# Patient Record
Sex: MALE | Race: WHITE
[De-identification: names, ages, dates, MRNs, and addresses within clinical notes are randomized per-mention and may not be internally consistent; named-entity substitution may affect disease eponyms.]

---

## 2017-11-24 ENCOUNTER — HOSPITAL ENCOUNTER (OUTPATIENT)
Dept: HOSPITAL 53 - M LAB | Age: 32
End: 2017-11-24
Attending: INTERNAL MEDICINE
Payer: OTHER GOVERNMENT

## 2017-11-24 DIAGNOSIS — K74.60: ICD-10-CM

## 2017-11-24 DIAGNOSIS — K50.918: Primary | ICD-10-CM

## 2017-11-24 LAB
ALBUMIN SERPL BCG-MCNC: 3.5 GM/DL (ref 3.2–5.2)
ALBUMIN/GLOB SERPL: 0.85 {RATIO} (ref 1–1.93)
ALP SERPL-CCNC: 123 U/L (ref 45–117)
ALT SERPL W P-5'-P-CCNC: 11 U/L (ref 12–78)
ANION GAP SERPL CALC-SCNC: 6 MEQ/L (ref 8–16)
AST SERPL-CCNC: 11 U/L (ref 7–37)
BASOPHILS # BLD AUTO: 0 10^3/UL (ref 0–0.2)
BASOPHILS NFR BLD AUTO: 0.2 % (ref 0–1)
BILIRUB SERPL-MCNC: 2 MG/DL (ref 0.2–1)
BUN SERPL-MCNC: 8 MG/DL (ref 7–18)
CALCIUM SERPL-MCNC: 8.7 MG/DL (ref 8.5–10.1)
CHLORIDE SERPL-SCNC: 106 MEQ/L (ref 98–107)
CO2 SERPL-SCNC: 28 MEQ/L (ref 21–32)
CREAT SERPL-MCNC: 1.05 MG/DL (ref 0.7–1.3)
EOSINOPHIL # BLD AUTO: 0.1 10^3/UL (ref 0–0.5)
EOSINOPHIL NFR BLD AUTO: 2.6 % (ref 0–3)
ERYTHROCYTE [DISTWIDTH] IN BLOOD BY AUTOMATED COUNT: 12.4 % (ref 11.5–14.5)
GFR SERPL CREATININE-BSD FRML MDRD: > 60 ML/MIN/{1.73_M2} (ref 60–?)
GLUCOSE SERPL-MCNC: 97 MG/DL (ref 70–105)
IMM GRANULOCYTES NFR BLD: 0.5 % (ref 0–0)
LYMPHOCYTES # BLD AUTO: 1.1 10^3/UL (ref 1.5–4.5)
LYMPHOCYTES NFR BLD AUTO: 25 % (ref 24–44)
MCH RBC QN AUTO: 30.7 PG (ref 27–33)
MCHC RBC AUTO-ENTMCNC: 34 G/DL (ref 32–36.5)
MCV RBC AUTO: 90.3 FL (ref 80–96)
MONOCYTES # BLD AUTO: 0.4 10^3/UL (ref 0–0.8)
MONOCYTES NFR BLD AUTO: 9 % (ref 0–5)
NEUTROPHILS # BLD AUTO: 2.7 10^3/UL (ref 1.8–7.7)
NEUTROPHILS NFR BLD AUTO: 62.7 % (ref 36–66)
NRBC BLD AUTO-RTO: 0 % (ref 0–0)
PLATELET # BLD AUTO: 113 10^3/UL (ref 150–450)
POTASSIUM SERPL-SCNC: 4 MEQ/L (ref 3.5–5.1)
PROT SERPL-MCNC: 7.6 GM/DL (ref 6.4–8.2)
SODIUM SERPL-SCNC: 140 MEQ/L (ref 136–145)
WBC # BLD AUTO: 4.2 10^3/UL (ref 4–10)

## 2017-12-18 ENCOUNTER — HOSPITAL ENCOUNTER (EMERGENCY)
Dept: HOSPITAL 53 - M ED | Age: 32
Discharge: HOME | End: 2017-12-18
Payer: OTHER GOVERNMENT

## 2017-12-18 VITALS — WEIGHT: 165.35 LBS | BODY MASS INDEX: 21.91 KG/M2 | HEIGHT: 73 IN

## 2017-12-18 VITALS — DIASTOLIC BLOOD PRESSURE: 87 MMHG | SYSTOLIC BLOOD PRESSURE: 139 MMHG

## 2017-12-18 DIAGNOSIS — Z88.6: ICD-10-CM

## 2017-12-18 DIAGNOSIS — R11.2: Primary | ICD-10-CM

## 2017-12-18 DIAGNOSIS — R10.9: ICD-10-CM

## 2017-12-18 DIAGNOSIS — K74.60: ICD-10-CM

## 2017-12-18 DIAGNOSIS — Z79.899: ICD-10-CM

## 2017-12-18 DIAGNOSIS — I85.00: ICD-10-CM

## 2017-12-18 DIAGNOSIS — Z96.89: ICD-10-CM

## 2017-12-18 LAB
ALBUMIN SERPL BCG-MCNC: 3.3 GM/DL (ref 3.2–5.2)
ALBUMIN/GLOB SERPL: 0.6 {RATIO} (ref 1–1.93)
ALP SERPL-CCNC: 123 U/L (ref 45–117)
ALT SERPL W P-5'-P-CCNC: 16 U/L (ref 12–78)
ANION GAP SERPL CALC-SCNC: 6 MEQ/L (ref 8–16)
AST SERPL-CCNC: 35 U/L (ref 7–37)
BASOPHILS # BLD AUTO: 0 10^3/UL (ref 0–0.2)
BASOPHILS NFR BLD AUTO: 0.2 % (ref 0–1)
BILIRUB CONJ SERPL-MCNC: 0.3 MG/DL (ref 0–0.2)
BILIRUB SERPL-MCNC: 2.1 MG/DL (ref 0.2–1)
BUN SERPL-MCNC: 12 MG/DL (ref 7–18)
CALCIUM SERPL-MCNC: 8.9 MG/DL (ref 8.5–10.1)
CHLORIDE SERPL-SCNC: 98 MEQ/L (ref 98–107)
CO2 SERPL-SCNC: 28 MEQ/L (ref 21–32)
CREAT SERPL-MCNC: 1 MG/DL (ref 0.7–1.3)
EOSINOPHIL # BLD AUTO: 0.1 10^3/UL (ref 0–0.5)
EOSINOPHIL NFR BLD AUTO: 1.1 % (ref 0–3)
ERYTHROCYTE [DISTWIDTH] IN BLOOD BY AUTOMATED COUNT: 11.9 % (ref 11.5–14.5)
GFR SERPL CREATININE-BSD FRML MDRD: > 60 ML/MIN/{1.73_M2} (ref 60–?)
GLUCOSE SERPL-MCNC: 85 MG/DL (ref 70–105)
IMM GRANULOCYTES NFR BLD: 0.7 % (ref 0–0)
IMMATURE PLATELET FRACTION %: 3.5 % (ref 0–10.9)
INR PPP: 1.06
LYMPHOCYTES # BLD AUTO: 0.5 10^3/UL (ref 1.5–4.5)
LYMPHOCYTES NFR BLD AUTO: 11.3 % (ref 24–44)
MCH RBC QN AUTO: 30.8 PG (ref 27–33)
MCHC RBC AUTO-ENTMCNC: 35.4 G/DL (ref 32–36.5)
MCV RBC AUTO: 87 FL (ref 80–96)
MONOCYTES # BLD AUTO: 0.5 10^3/UL (ref 0–0.8)
MONOCYTES NFR BLD AUTO: 11.3 % (ref 0–5)
NEUTROPHILS # BLD AUTO: 3.3 10^3/UL (ref 1.8–7.7)
NEUTROPHILS NFR BLD AUTO: 75.4 % (ref 36–66)
NRBC BLD AUTO-RTO: 0 % (ref 0–0)
PLATELET # BLD AUTO: 97 10^3/UL (ref 150–450)
PLATELET F: 3.4
POTASSIUM SERPL-SCNC: 4.1 MEQ/L (ref 3.5–5.1)
PROT SERPL-MCNC: 8.8 GM/DL (ref 6.4–8.2)
SODIUM SERPL-SCNC: 132 MEQ/L (ref 136–145)
WBC # BLD AUTO: 4.4 10^3/UL (ref 4–10)

## 2017-12-22 ENCOUNTER — HOSPITAL ENCOUNTER (OUTPATIENT)
Dept: HOSPITAL 53 - M RAD | Age: 32
End: 2017-12-22
Attending: INTERNAL MEDICINE
Payer: OTHER GOVERNMENT

## 2017-12-22 DIAGNOSIS — K50.918: Primary | ICD-10-CM

## 2017-12-22 DIAGNOSIS — K74.60: ICD-10-CM

## 2017-12-22 DIAGNOSIS — K80.20: ICD-10-CM

## 2017-12-22 NOTE — REP
ULTRASOUND ABDOMEN WITH DUPLEX DOPPLER EVALUATION OF PORTAL VASCULATURE:

 

Real-time sonographic evaluation of the abdomen performed.   The gallbladder is

partially contracted and contains multiple mobile gallstones.  There is no

gallbladder wall thickening or pericholecystic fluid. There is no intrahepatic or

extrahepatic biliary dilatation, common bile duct measuring 5 mm in diameter.

There is mild hepatomegaly. TIPS shunt is noted.  There is mild splenomegaly with

a length of 16.6 cm.  No intrinsic splenic abnormality is seen.  The visualized

pancreas is grossly unremarkable, not well seen due to overlying bowel gas. The

kidneys are normal in size and echotexture, right kidney measuring 12.0 x 6.8 x

5.6 cm and the left kidney 13.5 x 6.2 x 6.0 cm.  There is no hydronephrosis,

renal mass or nephrolithiasis. The abdominal aorta is normal in caliber with no

aneurysm, maximum AP diameter proximally 1.8 cm, mid aspect 2.0 cm and distally

1.6 cm. There is no ascites.

 

Real-time ultrasound evaluation and duplex Doppler interrogation of the portal

vasculature is performed.  The main portal vein is mildly dilated at 17 mm.  Peak

velocity is 39.7 cm/s and diminished in the intrahepatic portal vein branches.

TIPS shunt is patent.  Peak velocity in the proximal aspect of the shunt is 38.5

cm/s, mid aspect 26.1 cm/s and at the distal aspect 117.1 cm/s.  There is no

intraluminal thrombus.  The proximal velocities are lower than expected but there

is no evidence of stenosis.  There is normal direction of flow in the remaining

portal vasculature including the splenic vein, which demonstrates a peak velocity

of 29.3 cm/s.  Peak systolic velocity of the main hepatic artery was 52.6 cm/s

with a resistive index of 0.7.  The hepatic veins demonstrate triphasic

waveforms.

 

IMPRESSION:

 

Mild hepatosplenomegaly.

 

Multiple gallstones in a contracted gallbladder without biliary dilatation or

free fluid.

 

 Patent tips shunt as discussed in detail above.

 

 

Signed by

Gurvinder Davila MD 12/22/2017 03:51 P

## 2017-12-26 ENCOUNTER — HOSPITAL ENCOUNTER (EMERGENCY)
Dept: HOSPITAL 53 - M ED | Age: 32
Discharge: ANOTHER HOSPITAL | End: 2017-12-26
Payer: OTHER GOVERNMENT

## 2017-12-26 DIAGNOSIS — K75.0: Primary | ICD-10-CM

## 2017-12-26 DIAGNOSIS — Z96.89: ICD-10-CM

## 2017-12-26 DIAGNOSIS — Z79.899: ICD-10-CM

## 2017-12-26 DIAGNOSIS — D62: ICD-10-CM

## 2017-12-26 DIAGNOSIS — Z88.6: ICD-10-CM

## 2017-12-26 DIAGNOSIS — K52.9: ICD-10-CM

## 2017-12-26 DIAGNOSIS — R50.9: ICD-10-CM

## 2017-12-26 DIAGNOSIS — K80.20: ICD-10-CM

## 2017-12-26 LAB
ALBUMIN/GLOBULIN RATIO: 0.46 (ref 1–1.93)
ALBUMIN: 2.6 GM/DL (ref 3.2–5.2)
ALKALINE PHOSPHATASE: 137 U/L (ref 45–117)
ALT SERPL W P-5'-P-CCNC: 12 U/L (ref 12–78)
AMYLASE SERPL-CCNC: 41 U/L (ref 25–115)
ANION GAP: 9 MEQ/L (ref 8–16)
AST SERPL-CCNC: 21 U/L (ref 7–37)
BASO #: 0 10^3/UL (ref 0–0.2)
BASO %: 0.1 % (ref 0–1)
BILIRUB CONJ SERPL-MCNC: 1 MG/DL (ref 0–0.2)
BILIRUBIN,TOTAL: 2.7 MG/DL (ref 0.2–1)
BLOOD UREA NITROGEN: 10 MG/DL (ref 7–18)
CALCIUM LEVEL: 8.2 MG/DL (ref 8.5–10.1)
CARBON DIOXIDE LEVEL: 25 MEQ/L (ref 21–32)
CHLORIDE LEVEL: 99 MEQ/L (ref 98–107)
CREATININE FOR GFR: 0.93 MG/DL (ref 0.7–1.3)
EOS #: 0.1 10^3/UL (ref 0–0.5)
EOSINOPHIL NFR BLD AUTO: 0.7 % (ref 0–3)
GFR SERPL CREATININE-BSD FRML MDRD: > 60 ML/MIN/{1.73_M2} (ref 60–?)
GLUCOSE, FASTING: 97 MG/DL (ref 70–105)
IMMATURE GRANULOCYTE #: 0.1 10^3/UL (ref 0–0)
IMMATURE GRANULOCYTE %: 0.6 % (ref 0–0)
INR: 1.19
INR: 1.27
LACTIC ACID SEPSIS PROTOCOL: 1.2 MMOL/L (ref 0.4–2)
LYMPH #: 0.7 10^3/UL (ref 1.5–4.5)
LYMPH %: 7.9 % (ref 24–44)
MEAN CORPUSCULAR HEMOGLOBIN: 30.4 PG (ref 27–33)
MEAN CORPUSCULAR HGB CONC: 34.6 G/DL (ref 32–36.5)
MEAN CORPUSCULAR VOLUME: 87.9 FL (ref 80–96)
MONO #: 0.8 10^3/UL (ref 0–0.8)
MONO %: 9.4 % (ref 0–5)
NEUTROPHILS #: 6.6 10^3/UL (ref 1.8–7.7)
NEUTROPHILS %: 81.3 % (ref 36–66)
NRBC BLD AUTO-RTO: 0 % (ref 0–0)
PLATELET COUNT, AUTOMATED: 166 10^3/UL (ref 150–450)
POTASSIUM SERUM: 3.7 MEQ/L (ref 3.5–5.1)
RED CELL DISTRIBUTION WIDTH: 11.9 % (ref 11.5–14.5)
SODIUM LEVEL: 133 MEQ/L (ref 136–145)
TOTAL PROTEIN: 8.2 GM/DL (ref 6.4–8.2)
WHITE BLOOD COUNT: 8.2 10^3/UL (ref 4–10)

## 2017-12-26 RX ADMIN — DIATRIZOATE MEGLUMINE AND DIATRIZOATE SODIUM 1 ML: 600; 100 SOLUTION ORAL; RECTAL at 13:47

## 2017-12-26 RX ADMIN — DIATRIZOATE MEGLUMINE AND DIATRIZOATE SODIUM 1 ML: 600; 100 SOLUTION ORAL; RECTAL at 14:15

## 2017-12-26 RX ADMIN — DEXTROSE MONOHYDRATE 1 MLS/HR: 50 INJECTION, SOLUTION INTRAVENOUS at 18:34

## 2017-12-26 RX ADMIN — ACETAMINOPHEN 1 MG: 325 TABLET ORAL at 17:25

## 2017-12-26 RX ADMIN — PIPERACILLIN SODIUM AND TAZOBACTAM SODIUM 1 MLS/HR: 36; 4.5 INJECTION, POWDER, FOR SOLUTION INTRAVENOUS at 17:25

## 2017-12-26 RX ADMIN — SODIUM CHLORIDE 1 MLS/HR: 9 INJECTION, SOLUTION INTRAVENOUS at 18:34

## 2017-12-26 RX ADMIN — SODIUM CHLORIDE 1 MLS/HR: 9 INJECTION, SOLUTION INTRAVENOUS at 11:00

## 2018-01-20 ENCOUNTER — HOSPITAL ENCOUNTER (EMERGENCY)
Dept: HOSPITAL 53 - M ED | Age: 33
Discharge: ANOTHER HOSPITAL | End: 2018-01-20
Payer: COMMERCIAL

## 2018-01-20 DIAGNOSIS — K74.60: ICD-10-CM

## 2018-01-20 DIAGNOSIS — K75.0: Primary | ICD-10-CM

## 2018-01-20 DIAGNOSIS — Z79.899: ICD-10-CM

## 2018-01-20 DIAGNOSIS — Z88.6: ICD-10-CM

## 2018-01-20 DIAGNOSIS — F17.200: ICD-10-CM

## 2018-01-20 DIAGNOSIS — I82.621: ICD-10-CM

## 2018-01-20 DIAGNOSIS — Z95.9: ICD-10-CM

## 2018-01-20 DIAGNOSIS — J45.909: ICD-10-CM

## 2018-01-20 LAB
ALBUMIN/GLOBULIN RATIO: 0.44 (ref 1–1.93)
ALBUMIN: 2.9 GM/DL (ref 3.2–5.2)
ALKALINE PHOSPHATASE: 154 U/L (ref 45–117)
ALT SERPL W P-5'-P-CCNC: 7 U/L (ref 12–78)
ANION GAP: 6 MEQ/L (ref 8–16)
AST SERPL-CCNC: 18 U/L (ref 7–37)
BASO #: 0 10^3/UL (ref 0–0.2)
BASO %: 0.6 % (ref 0–1)
BILIRUB CONJ SERPL-MCNC: 0.3 MG/DL (ref 0–0.2)
BILIRUBIN,TOTAL: 0.9 MG/DL (ref 0.2–1)
BLOOD UREA NITROGEN: 6 MG/DL (ref 7–18)
CALCIUM LEVEL: 8.6 MG/DL (ref 8.5–10.1)
CARBON DIOXIDE LEVEL: 28 MEQ/L (ref 21–32)
CHLORIDE LEVEL: 105 MEQ/L (ref 98–107)
CREATININE FOR GFR: 0.78 MG/DL (ref 0.7–1.3)
EOS #: 0.1 10^3/UL (ref 0–0.5)
EOSINOPHIL NFR BLD AUTO: 2.8 % (ref 0–3)
GFR SERPL CREATININE-BSD FRML MDRD: > 60 ML/MIN/{1.73_M2} (ref 60–?)
GLUCOSE, FASTING: 115 MG/DL (ref 70–105)
HEMATOCRIT: 42.7 % (ref 42–52)
HEMOGLOBIN: 13.8 G/DL (ref 14–18)
IMMATURE GRANULOCYTE #: 0 10^3/UL (ref 0–0)
IMMATURE GRANULOCYTE %: 0.9 % (ref 0–0)
INR: 1.15
LACTIC ACID SEPSIS PROTOCOL: 1.4 MMOL/L (ref 0.4–2)
LYMPH #: 1.3 10^3/UL (ref 1.5–4.5)
LYMPH %: 26.7 % (ref 24–44)
MEAN CORPUSCULAR HEMOGLOBIN: 28.9 PG (ref 27–33)
MEAN CORPUSCULAR HGB CONC: 32.3 G/DL (ref 32–36.5)
MEAN CORPUSCULAR VOLUME: 89.5 FL (ref 80–96)
MONO #: 0.5 10^3/UL (ref 0–0.8)
MONO %: 10.9 % (ref 0–5)
NEUTROPHILS #: 2.7 10^3/UL (ref 1.8–7.7)
NEUTROPHILS %: 58.1 % (ref 36–66)
NRBC BLD AUTO-RTO: 0 % (ref 0–0)
PARTIAL THROMBOPLASTIN TIME: 38.8 SECONDS (ref 26.8–37.9)
PLATELET COUNT, AUTOMATED: 202 10^3/UL (ref 150–450)
POTASSIUM SERUM: 4 MEQ/L (ref 3.5–5.1)
PROTHROMBIN TIME: 14.9 SECONDS (ref 12.4–14.5)
RED BLOOD COUNT: 4.77 10^6/UL (ref 4.3–6.1)
RED CELL DISTRIBUTION WIDTH: 13 % (ref 11.5–14.5)
SODIUM LEVEL: 139 MEQ/L (ref 136–145)
TOTAL PROTEIN: 9.5 GM/DL (ref 6.4–8.2)
WHITE BLOOD COUNT: 4.7 10^3/UL (ref 4–10)

## 2018-01-20 RX ADMIN — SODIUM CHLORIDE 1 ML: 9 INJECTION, SOLUTION INTRAMUSCULAR; INTRAVENOUS; SUBCUTANEOUS at 10:32

## 2018-01-20 RX ADMIN — ENOXAPARIN SODIUM 1 MG: 100 INJECTION SUBCUTANEOUS at 10:45

## 2018-01-20 RX ADMIN — Medication 1 UNITS: at 10:32

## 2018-03-01 ENCOUNTER — HOSPITAL ENCOUNTER (EMERGENCY)
Dept: HOSPITAL 53 - M ED | Age: 33
Discharge: HOME | End: 2018-03-01
Payer: COMMERCIAL

## 2018-03-01 DIAGNOSIS — J45.909: ICD-10-CM

## 2018-03-01 DIAGNOSIS — Z79.51: ICD-10-CM

## 2018-03-01 DIAGNOSIS — K50.90: ICD-10-CM

## 2018-03-01 DIAGNOSIS — I26.99: Primary | ICD-10-CM

## 2018-03-01 DIAGNOSIS — Z77.098: ICD-10-CM

## 2018-03-01 DIAGNOSIS — Z79.01: ICD-10-CM

## 2018-03-01 DIAGNOSIS — Z88.6: ICD-10-CM

## 2018-03-01 DIAGNOSIS — K74.60: ICD-10-CM

## 2018-03-01 DIAGNOSIS — D64.9: ICD-10-CM

## 2018-03-01 DIAGNOSIS — Z79.899: ICD-10-CM

## 2018-03-01 LAB
ALBUMIN/GLOBULIN RATIO: 0.77 (ref 1–1.93)
ALBUMIN: 3.3 GM/DL (ref 3.2–5.2)
ALKALINE PHOSPHATASE: 89 U/L (ref 45–117)
ALT SERPL W P-5'-P-CCNC: 11 U/L (ref 12–78)
ANION GAP: 5 MEQ/L (ref 8–16)
AST SERPL-CCNC: 22 U/L (ref 7–37)
BASO #: 0 10^3/UL (ref 0–0.2)
BASO %: 0.2 % (ref 0–1)
BILIRUB CONJ SERPL-MCNC: 0.2 MG/DL (ref 0–0.2)
BILIRUBIN,TOTAL: 0.9 MG/DL (ref 0.2–1)
BLOOD UREA NITROGEN: 5 MG/DL (ref 7–18)
CALCIUM LEVEL: 8.4 MG/DL (ref 8.5–10.1)
CARBON DIOXIDE LEVEL: 30 MEQ/L (ref 21–32)
CHLORIDE LEVEL: 105 MEQ/L (ref 98–107)
CK MB CFR.DF SERPL CALC: 2.17
CK SERPL-CCNC: 46 U/L (ref 39–308)
CK-MB VALUE MASS: 1 NG/ML (ref 0–3.6)
CREATININE FOR GFR: 0.8 MG/DL (ref 0.7–1.3)
EOS #: 0.1 10^3/UL (ref 0–0.5)
EOSINOPHIL NFR BLD AUTO: 1.4 % (ref 0–3)
GFR SERPL CREATININE-BSD FRML MDRD: > 60 ML/MIN/{1.73_M2} (ref 60–?)
GLUCOSE, FASTING: 75 MG/DL (ref 70–100)
HEMATOCRIT: 45 % (ref 42–52)
HEMOGLOBIN: 15.1 G/DL (ref 14–18)
IMMATURE GRANULOCYTE %: 0.2 % (ref 0–3)
INR: 1.06
LYMPH #: 1.2 10^3/UL (ref 1.5–4.5)
LYMPH %: 28 % (ref 24–44)
MEAN CORPUSCULAR HEMOGLOBIN: 30.9 PG (ref 27–33)
MEAN CORPUSCULAR HGB CONC: 33.6 G/DL (ref 32–36.5)
MEAN CORPUSCULAR VOLUME: 92 FL (ref 80–96)
MONO #: 0.3 10^3/UL (ref 0–0.8)
MONO %: 7.6 % (ref 0–5)
NEUTROPHILS #: 2.7 10^3/UL (ref 1.8–7.7)
NEUTROPHILS %: 62.6 % (ref 36–66)
NRBC BLD AUTO-RTO: 0 % (ref 0–0)
PLATELET COUNT, AUTOMATED: 123 10^3/UL (ref 150–450)
POTASSIUM SERUM: 4 MEQ/L (ref 3.5–5.1)
PROTHROMBIN TIME: 13.9 SECONDS (ref 12.4–14.5)
RED BLOOD COUNT: 4.89 10^6/UL (ref 4.3–6.1)
RED CELL DISTRIBUTION WIDTH: 14.6 % (ref 11.5–14.5)
SODIUM LEVEL: 140 MEQ/L (ref 136–145)
TOTAL PROTEIN: 7.6 GM/DL (ref 6.4–8.2)
TROPONIN I: < 0.02 NG/ML (ref ?–0.1)
WHITE BLOOD COUNT: 4.4 10^3/UL (ref 4–10)

## 2018-03-01 PROCEDURE — 93005 ELECTROCARDIOGRAM TRACING: CPT

## 2022-02-18 ENCOUNTER — HOSPITAL ENCOUNTER (OUTPATIENT)
Dept: HOSPITAL 53 - M LABSMTC | Age: 37
End: 2022-02-18
Attending: ANESTHESIOLOGY
Payer: COMMERCIAL

## 2022-02-18 DIAGNOSIS — Z01.812: Primary | ICD-10-CM

## 2022-02-18 DIAGNOSIS — Z20.822: ICD-10-CM

## 2022-02-23 ENCOUNTER — HOSPITAL ENCOUNTER (OUTPATIENT)
Dept: HOSPITAL 53 - M OPP | Age: 37
Discharge: HOME | End: 2022-02-23
Attending: INTERNAL MEDICINE
Payer: COMMERCIAL

## 2022-02-23 VITALS — DIASTOLIC BLOOD PRESSURE: 66 MMHG | SYSTOLIC BLOOD PRESSURE: 120 MMHG

## 2022-02-23 VITALS — HEIGHT: 73 IN | WEIGHT: 187 LBS | BODY MASS INDEX: 24.78 KG/M2

## 2022-02-23 DIAGNOSIS — K22.89: ICD-10-CM

## 2022-02-23 DIAGNOSIS — K62.89: ICD-10-CM

## 2022-02-23 DIAGNOSIS — K63.89: ICD-10-CM

## 2022-02-23 DIAGNOSIS — Z79.899: ICD-10-CM

## 2022-02-23 DIAGNOSIS — Z88.8: ICD-10-CM

## 2022-02-23 DIAGNOSIS — K50.10: Primary | ICD-10-CM

## 2022-02-23 DIAGNOSIS — K64.0: ICD-10-CM

## 2022-02-23 DIAGNOSIS — K74.60: ICD-10-CM

## 2022-02-23 DIAGNOSIS — K63.5: ICD-10-CM

## 2022-02-23 DIAGNOSIS — K44.9: ICD-10-CM

## 2022-02-23 DIAGNOSIS — F17.210: ICD-10-CM

## 2022-02-23 DIAGNOSIS — Z08: ICD-10-CM
